# Patient Record
Sex: FEMALE | Race: WHITE | NOT HISPANIC OR LATINO | Employment: OTHER | URBAN - METROPOLITAN AREA
[De-identification: names, ages, dates, MRNs, and addresses within clinical notes are randomized per-mention and may not be internally consistent; named-entity substitution may affect disease eponyms.]

---

## 2023-07-02 ENCOUNTER — HOSPITAL ENCOUNTER (EMERGENCY)
Facility: CLINIC | Age: 53
Discharge: HOME | End: 2023-07-02
Attending: PHYSICIAN ASSISTANT | Admitting: PHYSICIAN ASSISTANT
Payer: COMMERCIAL

## 2023-07-02 VITALS
RESPIRATION RATE: 18 BRPM | TEMPERATURE: 98.3 F | SYSTOLIC BLOOD PRESSURE: 140 MMHG | OXYGEN SATURATION: 97 % | BODY MASS INDEX: 32.45 KG/M2 | WEIGHT: 195 LBS | DIASTOLIC BLOOD PRESSURE: 88 MMHG | HEART RATE: 75 BPM
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
## 2023-07-02 PROCEDURE — 99284 EMERGENCY DEPT VISIT MOD MDM: CPT | Mod: 25 | Performed by: PHYSICIAN ASSISTANT

## 2023-07-02 PROCEDURE — 250N000013 HC RX MED GY IP 250 OP 250 PS 637: Performed by: PHYSICIAN ASSISTANT

## 2023-07-02 PROCEDURE — 99284 EMERGENCY DEPT VISIT MOD MDM: CPT | Performed by: PHYSICIAN ASSISTANT

## 2023-07-02 PROCEDURE — 250N000011 HC RX IP 250 OP 636: Performed by: PHYSICIAN ASSISTANT

## 2023-07-02 PROCEDURE — 96365 THER/PROPH/DIAG IV INF INIT: CPT | Performed by: PHYSICIAN ASSISTANT

## 2023-07-02 RX ORDER — ATORVASTATIN CALCIUM 20 MG/1
20 TABLET, FILM COATED ORAL AT BEDTIME
COMMUNITY
Start: 2023-03-17

## 2023-07-02 RX ORDER — HYDROXYZINE HYDROCHLORIDE 25 MG/1
25 TABLET, FILM COATED ORAL 3 TIMES DAILY
COMMUNITY
Start: 2023-01-02

## 2023-07-02 RX ORDER — ASPIRIN 81 MG/1
81 TABLET ORAL DAILY
COMMUNITY
Start: 2023-03-17

## 2023-07-02 RX ORDER — ALBUTEROL SULFATE 90 UG/1
2 AEROSOL, METERED RESPIRATORY (INHALATION) EVERY 6 HOURS PRN
COMMUNITY
Start: 2023-03-17

## 2023-07-02 RX ORDER — PROPRANOLOL HYDROCHLORIDE 20 MG/1
20 TABLET ORAL 3 TIMES DAILY
COMMUNITY
Start: 2023-03-17

## 2023-07-02 RX ORDER — OXYCODONE AND ACETAMINOPHEN 5; 325 MG/1; MG/1
1-1.5 TABLET ORAL EVERY 4 HOURS PRN
COMMUNITY
Start: 2023-06-16

## 2023-07-02 RX ORDER — ESTRADIOL 0.03 MG/D
1 PATCH TRANSDERMAL
COMMUNITY
Start: 2023-03-17

## 2023-07-02 RX ORDER — FAMOTIDINE 20 MG/1
20 TABLET, FILM COATED ORAL 2 TIMES DAILY
COMMUNITY
Start: 2023-05-20

## 2023-07-02 RX ORDER — CYCLOBENZAPRINE HCL 10 MG
10 TABLET ORAL 3 TIMES DAILY PRN
COMMUNITY
Start: 2023-06-01

## 2023-07-02 RX ORDER — AMPICILLIN AND SULBACTAM 2; 1 G/1; G/1
3 INJECTION, POWDER, FOR SOLUTION INTRAMUSCULAR; INTRAVENOUS ONCE
Status: COMPLETED | OUTPATIENT
Start: 2023-07-02 | End: 2023-07-02

## 2023-07-02 RX ORDER — BISMUTH SUBSALICYLATE 262 MG/1
2 TABLET, CHEWABLE ORAL 3 TIMES DAILY PRN
COMMUNITY
Start: 2023-03-17

## 2023-07-02 RX ORDER — TRAZODONE HYDROCHLORIDE 100 MG/1
200 TABLET ORAL AT BEDTIME
COMMUNITY
Start: 2023-03-17

## 2023-07-02 RX ORDER — PROGESTERONE 100 MG/1
100 CAPSULE ORAL AT BEDTIME
COMMUNITY
Start: 2023-03-17

## 2023-07-02 RX ORDER — OXYCODONE HYDROCHLORIDE 5 MG/1
5 TABLET ORAL ONCE
Status: COMPLETED | OUTPATIENT
Start: 2023-07-02 | End: 2023-07-02

## 2023-07-02 RX ORDER — NYSTATIN 100000 [USP'U]/G
1 POWDER TOPICAL 3 TIMES DAILY
COMMUNITY
Start: 2023-04-14

## 2023-07-02 RX ORDER — ONDANSETRON 4 MG/1
4 TABLET, ORALLY DISINTEGRATING ORAL EVERY 8 HOURS PRN
COMMUNITY
Start: 2023-03-17

## 2023-07-02 RX ORDER — PREGABALIN 150 MG/1
150 CAPSULE ORAL 2 TIMES DAILY
COMMUNITY
Start: 2023-05-16

## 2023-07-02 RX ORDER — POTASSIUM CHLORIDE 750 MG/1
20 TABLET, EXTENDED RELEASE ORAL DAILY
COMMUNITY
Start: 2023-03-17

## 2023-07-02 RX ORDER — SENNOSIDES 8.6 MG
1300 CAPSULE ORAL 2 TIMES DAILY
COMMUNITY

## 2023-07-02 RX ORDER — DULOXETIN HYDROCHLORIDE 60 MG/1
60 CAPSULE, DELAYED RELEASE ORAL DAILY
COMMUNITY
Start: 2023-03-17

## 2023-07-02 RX ADMIN — AMPICILLIN SODIUM AND SULBACTAM SODIUM 3 G: 2; 1 INJECTION, POWDER, FOR SOLUTION INTRAMUSCULAR; INTRAVENOUS at 19:01

## 2023-07-02 RX ADMIN — OXYCODONE HYDROCHLORIDE 5 MG: 5 TABLET ORAL at 19:15

## 2023-07-02 ASSESSMENT — ACTIVITIES OF DAILY LIVING (ADL): ADLS_ACUITY_SCORE: 35

## 2023-07-02 NOTE — ED PROVIDER NOTES
History     Chief Complaint   Patient presents with     Cat Bite       HPI  Gail L Schirmer is a 53 year old female who presents to the emergency department for concerns of a Cat bite to her left forearm.  Patient was giving her mother's cat of flea bath when it became upset and bit her several times on the left forearm.  Patient reports it bled quite a bit but that has since stopped.  There is no weakness in her fingers or tingling.  Her last tetanus was in 2020.  She does have a history of surgery on the left forearm.        Allergies:  Allergies   Allergen Reactions     No Known Drug Allergy        Problem List:    There are no problems to display for this patient.       Past Medical History:    Past Medical History:   Diagnosis Date     Degenerative disc disease        Past Surgical History:    Past Surgical History:   Procedure Laterality Date     ABDOMEN SURGERY      6 laps ,      APPENDECTOMY      1999     GYN SURGERY  1998    hysterectomy     ORTHOPEDIC SURGERY      multiple knee surgeries.        Family History:    History reviewed. No pertinent family history.    Social History:  Marital Status:   [2]  Social History     Tobacco Use     Smoking status: Every Day     Packs/day: 0.50     Types: Cigarettes   Substance Use Topics     Alcohol use: No     Drug use: No        Medications:    acetaminophen (TYLENOL 8 HOUR ARTHRITIS PAIN) 650 MG CR tablet  albuterol (PROAIR HFA/PROVENTIL HFA/VENTOLIN HFA) 108 (90 Base) MCG/ACT inhaler  amoxicillin-clavulanate (AUGMENTIN) 875-125 MG tablet  aspirin 81 MG EC tablet  atorvastatin (LIPITOR) 20 MG tablet  bismuth subsalicylate (PEPTO BISMOL) 262 MG chewable tablet  cyclobenzaprine (FLEXERIL) 10 MG tablet  DULoxetine (CYMBALTA) 60 MG capsule  estradiol (FEMPATCH) 0.025 MG/24HR weekly patch  famotidine (PEPCID) 20 MG tablet  hydrOXYzine (ATARAX) 25 MG tablet  nystatin (MYCOSTATIN) 528951 UNIT/GM external powder  ondansetron (ZOFRAN ODT) 4 MG ODT  tab  oxyCODONE-acetaminophen (PERCOCET) 5-325 MG tablet  potassium chloride ER (KLOR-CON M) 10 MEQ CR tablet  pregabalin (LYRICA) 150 MG capsule  progesterone (PROMETRIUM) 100 MG capsule  propranolol (INDERAL) 20 MG tablet  traZODone (DESYREL) 100 MG tablet          Review of Systems   All other systems reviewed and are negative.        Physical Exam   BP: 139/88  Pulse: 77  Temp: 98.6  F (37  C)  Resp: 18  Weight: 88.5 kg (195 lb)  SpO2: 97 %      Physical Exam  Vitals and nursing note reviewed.   Constitutional:       General: She is not in acute distress.     Appearance: Normal appearance. She is not ill-appearing, toxic-appearing or diaphoretic.   HENT:      Head: Normocephalic and atraumatic.      Nose: Nose normal.   Eyes:      Extraocular Movements: Extraocular movements intact.      Conjunctiva/sclera: Conjunctivae normal.   Cardiovascular:      Pulses: Normal pulses.   Pulmonary:      Effort: Pulmonary effort is normal. No respiratory distress.   Musculoskeletal:      Cervical back: Neck supple.      Comments: Left forearm: Several puncture wounds noted, 2 prominent wounds to the dorsal aspect of the forearm over the distal radius, no active bleeding but underlying contusion noted.  0.5 cm slightly gaping laceration to the anterior forearm over the distal ulna.  No underlying bony tenderness.  Normal strength in all digits with normal  strength.  Normal radial pulse.   Skin:     General: Skin is warm and dry.   Neurological:      General: No focal deficit present.      Mental Status: She is alert and oriented to person, place, and time. Mental status is at baseline.   Psychiatric:         Mood and Affect: Mood normal.         Behavior: Behavior normal.            ED Course           Procedures      No results found for this or any previous visit (from the past 24 hour(s)).    Medications   ampicillin-sulbactam (UNASYN) 3 g vial to attach to  mL bag (0 g Intravenous Stopped 7/2/23 1934)   oxyCODONE  (ROXICODONE) tablet 5 mg (5 mg Oral $Given 7/2/23 1915)         Assessments & Plan (with Medical Decision Making)  Gail L Schirmer is a 53 year old female who presents to the ED for concerns of a cat bite to her left forearm.  Cat vaccinations are up-to-date as are the patient's.  On exam today she had several puncture wounds to the left forearm with a couple smaller linear lacerations noted.  These were copiously irrigated here in the ED.  Patient was given IV Unasyn for infection prophylaxis.  Oxycodone given here for pain control during irrigation.  I did place a few 1/4 inch Steri-Strips over the 0.5 cm laceration to the arm but I did not do any suturing due to higher risk of infection to these areas.  I explained to the patient that these will need to heal by secondary intent.  I will prescribe Augmentin for infection prophylaxis at home going forward.  She can take her home pain medications as needed.  She was provided instructions to return to the ED if she does develop signs or symptoms of infection.  All questions answered and patient discharged home in suitable condition.     I have reviewed the nursing notes.    I have reviewed the findings, diagnosis, plan and need for follow up with the patient.      Discharge Medication List as of 7/2/2023  8:02 PM      START taking these medications    Details   amoxicillin-clavulanate (AUGMENTIN) 875-125 MG tablet Take 1 tablet by mouth 2 times daily, Disp-20 tablet, R-0, [facility]             Final diagnoses:   Cat bite, initial encounter     Note: Chart documentation done in part with Dragon Voice Recognition software. Although reviewed after completion, some word and grammatical errors may remain.     7/2/2023   [facility] EMERGENCY DEPT     Loulou Ghotra PA-C  07/02/23 0516

## 2023-07-02 NOTE — ED TRIAGE NOTES
Patient with cat bite to L forearm, was giving her mom's cat a flea bath when it bit her.      Triage Assessment     Row Name 07/02/23 1763       Triage Assessment (Adult)    Airway WDL WDL       Respiratory WDL    Respiratory WDL WDL       Cardiac WDL    Cardiac WDL WDL       Peripheral/Neurovascular WDL    Peripheral Neurovascular WDL WDL

## 2023-07-03 NOTE — DISCHARGE INSTRUCTIONS
Take the antibiotics to prevent infection.  Apply antibiotic ointment to wounds.  The Steri-Strips will fall off on their own in the next week.  These wounds will need to heal by secondary intent due to high risk of infection.  If you develop signs of infection please return to the emergency department.    Thank you for choosing [facility]'s Emergency Department. It was a pleasure taking care of you today. If you have any questions, please call [phone].    Loulou Ghotra PA-C

## 2023-07-03 NOTE — MEDICATION SCRIBE - ADMISSION MEDICATION HISTORY
Medication Scribe Admission Medication History    Admission medication history is complete. The information provided in this note is only as accurate as the sources available at the time of the update.    Medication reconciliation/reorder completed by provider prior to medication history? No    Information Source(s): Patient via in-person    Pertinent Information: n/a    Changes made to PTA medication list:    Added: ventolin, aspirin, lipitor, pepto, cymbalta, estradiol ptch, pepcid, imodium, zofran, potassum, lyrica, propranolol, hydroxyzine, percocet, trazodone, progesterone, nystatin    Deleted: gabapentin, norco, ibuprofen, miralax    Changed: tylenol to 650mg tabs    Medication Affordability:  Not including over the counter (OTC) medications, was there a time in the past 3 months when you did not take your medications as prescribed because of cost?: No    Allergies reviewed with patient and updates made in EHR: yes    Medication History Completed By: CHRISTINA BERG 7/2/2023 7:38 PM    Prior to Admission medications    Medication Sig Last Dose Taking? Auth Provider Long Term End Date   acetaminophen (TYLENOL 8 HOUR ARTHRITIS PAIN) 650 MG CR tablet Take 1,300 mg by mouth 2 times daily 7/1/2023 at hs Yes Reported, Patient     albuterol (PROAIR HFA/PROVENTIL HFA/VENTOLIN HFA) 108 (90 Base) MCG/ACT inhaler Inhale 2 puffs into the lungs every 6 hours as needed for wheezing 1st choice Past Week at unkn Yes Reported, Patient Yes    aspirin 81 MG EC tablet Take 81 mg by mouth daily 7/1/2023 at am Yes Reported, Patient     atorvastatin (LIPITOR) 20 MG tablet Take 20 mg by mouth At Bedtime 7/1/2023 at hs Yes Reported, Patient Yes    bismuth subsalicylate (PEPTO BISMOL) 262 MG chewable tablet Take 2 tablets by mouth 3 times daily as needed Upset stomach 7/1/2023 at hs Yes Reported, Patient     cyclobenzaprine (FLEXERIL) 10 MG tablet Take 10 mg by mouth 3 times daily as needed for muscle spasms 7/1/2023 at hs Yes Reported,  Patient     DULoxetine (CYMBALTA) 60 MG capsule Take 60 mg by mouth daily 7/1/2023 at am Yes Reported, Patient Yes    estradiol (FEMPATCH) 0.025 MG/24HR weekly patch Place 1 patch onto the skin every 7 days Past Week at current Yes Reported, Patient Yes    famotidine (PEPCID) 20 MG tablet Take 20 mg by mouth 2 times daily 7/1/2023 at hs Yes Reported, Patient     hydrOXYzine (ATARAX) 25 MG tablet Take 25 mg by mouth 3 times daily 7/1/2023 at hs Yes Reported, Patient     nystatin (MYCOSTATIN) 335225 UNIT/GM external powder Apply 1 strip topically 3 times daily Affected area Past Week at unkn Yes Reported, Patient     ondansetron (ZOFRAN ODT) 4 MG ODT tab Place 4 mg under the tongue every 8 hours as needed for nausea or vomiting Past Week at unkn Yes Reported, Patient     oxyCODONE-acetaminophen (PERCOCET) 5-325 MG tablet Take 1-1.5 tablets by mouth every 4 hours as needed for pain Max of 6 tablets daily 6/30/2023 at pm Yes Reported, Patient     potassium chloride ER (KLOR-CON M) 10 MEQ CR tablet Take 20 mEq by mouth daily 7/1/2023 at am Yes Reported, Patient     pregabalin (LYRICA) 150 MG capsule Take 150 mg by mouth 2 times daily 7/1/2023 at hs Yes Reported, Patient Yes    progesterone (PROMETRIUM) 100 MG capsule Take 100 mg by mouth At Bedtime 7/1/2023 at hs Yes Reported, Patient     propranolol (INDERAL) 20 MG tablet Take 20 mg by mouth 3 times daily 7/1/2023 at hs Yes Reported, Patient Yes    traZODone (DESYREL) 100 MG tablet Take 200 mg by mouth At Bedtime 7/1/2023 at hs Yes Reported, Patient Yes